# Patient Record
Sex: MALE | Race: OTHER | NOT HISPANIC OR LATINO | URBAN - METROPOLITAN AREA
[De-identification: names, ages, dates, MRNs, and addresses within clinical notes are randomized per-mention and may not be internally consistent; named-entity substitution may affect disease eponyms.]

---

## 2018-03-05 ENCOUNTER — EMERGENCY (EMERGENCY)
Facility: HOSPITAL | Age: 23
LOS: 0 days | Discharge: HOME | End: 2018-03-06

## 2018-03-05 VITALS
TEMPERATURE: 97 F | HEART RATE: 71 BPM | DIASTOLIC BLOOD PRESSURE: 68 MMHG | RESPIRATION RATE: 18 BRPM | OXYGEN SATURATION: 99 % | SYSTOLIC BLOOD PRESSURE: 133 MMHG
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
## 2018-03-05 DIAGNOSIS — R68.84 JAW PAIN: ICD-10-CM
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
## 2018-03-05 DIAGNOSIS — T74.11XA ADULT PHYSICAL ABUSE, CONFIRMED, INITIAL ENCOUNTER: ICD-10-CM

## 2018-03-06 NOTE — ED PROVIDER NOTE - PHYSICAL EXAMINATION
VITAL SIGNS: I have reviewed nursing notes and confirm.  CONSTITUTIONAL: Well-developed; well-nourished; in no acute distress.   SKIN: skin exam is warm and dry, no acute rash.    HEAD: Mild TTP along left jaw line, Normocephalic; atraumatic.  EYES: conjunctiva and sclera clear.  ENT: airway patent, able to open and close mouth with easeNo nasal discharge; airway clear.  CARD: S1, S2 normal; no murmurs, gallops, or rubs. Regular rate and rhythm.   RESP: No wheezes, rales or rhonchi.  EXT: Normal ROM.  No clubbing, cyanosis or edema.   NEURO: Alert, oriented, grossly unremarkable
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
## 2018-03-06 NOTE — ED PROVIDER NOTE - PROGRESS NOTE DETAILS
pt instructed to f/u with dental clinic if pain persists, pt did not want to stay for ct scan at this time, pt is tolerating secretions, opens and closes mouth with ease

## 2018-03-06 NOTE — ED PROVIDER NOTE - NS ED ROS FT
Eyes:  No visual changes, eye pain or discharge.  ENMT:  + jaw pain., No hearing changes, pain, discharge or infections. No neck pain or stiffness.  Respiratory:  No cough or respiratory distress. No hemoptysis. No history of asthma or RAD.  GI:  No nausea, vomiting, diarrhea or abdominal pain.  MS:  No myalgia, muscle weakness, joint pain or back pain.  Neuro:  No headache or weakness.  No LOC.  Skin:  No skin rash.   Endocrine: No history of thyroid disease or diabetes.  Except as documented in the HPI,  all other systems are negative.
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
## 2019-01-18 PROBLEM — Z00.00 ENCOUNTER FOR PREVENTIVE HEALTH EXAMINATION: Status: ACTIVE | Noted: 2019-01-18

## 2019-01-22 ENCOUNTER — APPOINTMENT (OUTPATIENT)
Dept: INTERNAL MEDICINE | Facility: CLINIC | Age: 24
End: 2019-01-22

## 2019-08-26 ENCOUNTER — EMERGENCY (EMERGENCY)
Facility: HOSPITAL | Age: 24
LOS: 0 days | Discharge: HOME | End: 2019-08-26
Attending: EMERGENCY MEDICINE | Admitting: EMERGENCY MEDICINE
Payer: SUBSIDIZED

## 2019-08-26 VITALS
DIASTOLIC BLOOD PRESSURE: 65 MMHG | RESPIRATION RATE: 18 BRPM | SYSTOLIC BLOOD PRESSURE: 131 MMHG | OXYGEN SATURATION: 98 % | TEMPERATURE: 99 F | HEART RATE: 96 BPM

## 2019-08-26 DIAGNOSIS — N48.89 OTHER SPECIFIED DISORDERS OF PENIS: ICD-10-CM

## 2019-08-26 DIAGNOSIS — Z20.2 CONTACT WITH AND (SUSPECTED) EXPOSURE TO INFECTIONS WITH A PREDOMINANTLY SEXUAL MODE OF TRANSMISSION: ICD-10-CM

## 2019-08-26 PROCEDURE — 99284 EMERGENCY DEPT VISIT MOD MDM: CPT

## 2019-08-26 RX ORDER — PENICILLIN G BENZATHINE 1200000 [IU]/2ML
2.4 INJECTION, SUSPENSION INTRAMUSCULAR ONCE
Refills: 0 | Status: COMPLETED | OUTPATIENT
Start: 2019-08-26 | End: 2019-08-26

## 2019-08-26 RX ORDER — CEFTRIAXONE 500 MG/1
250 INJECTION, POWDER, FOR SOLUTION INTRAMUSCULAR; INTRAVENOUS ONCE
Refills: 0 | Status: COMPLETED | OUTPATIENT
Start: 2019-08-26 | End: 2019-08-26

## 2019-08-26 RX ORDER — AZITHROMYCIN 500 MG/1
1 TABLET, FILM COATED ORAL ONCE
Refills: 0 | Status: COMPLETED | OUTPATIENT
Start: 2019-08-26 | End: 2019-08-26

## 2019-08-26 RX ADMIN — CEFTRIAXONE 250 MILLIGRAM(S): 500 INJECTION, POWDER, FOR SOLUTION INTRAMUSCULAR; INTRAVENOUS at 13:56

## 2019-08-26 RX ADMIN — AZITHROMYCIN 1 GRAM(S): 500 TABLET, FILM COATED ORAL at 13:56

## 2019-08-26 RX ADMIN — PENICILLIN G BENZATHINE 2.4 MILLION UNIT(S): 1200000 INJECTION, SUSPENSION INTRAMUSCULAR at 13:56

## 2019-08-26 NOTE — ED PROVIDER NOTE - PHYSICAL EXAMINATION
PHYSICAL EXAM:  Gen: Patient is well appearing, non cachetcic  Neck: FROM, supple, no cervical LAD  Resp: CTABL, no crackles/wheezes, good air entry, no tachypnea or retractions  CV: RRR, normal S1/S2, no murmurs   Abd: Soft, NT/ND, no HSM appreciated, +BS  : PHYSICAL EXAM:  Gen: Patient is well appearing, non cachetcic  Neck: FROM, supple, no cervical LAD  Resp: CTABL, no crackles/wheezes, good air entry, no tachypnea or retractions  CV: RRR, normal S1/S2, no murmurs   Abd: Soft, NT/ND, no HSM appreciated, +BS  : uncirmucised male, copious white penile discharge, testes descneded, cremasteric reflex bilaterally

## 2019-08-26 NOTE — ED PROVIDER NOTE - OBJECTIVE STATEMENT
24 year old male with a pmhx of mild intermittent asthma presenting with one week history of penile dicsharge, bleeding and burning on urination. As per patients symptoms have been progressing over the past week. He recently returned from [facility] and asked his friend who is an RN who recommended getting STI testing. Patient denies fever, abdominal pain, cough, congestion.  Patient sexually active with multiple female partners, inconsistent condom use, never been tested for STI before.   PMhx: intermittent asthma  Psx: none  UTD vaccines  NKDA

## 2019-08-26 NOTE — ED PROVIDER NOTE - PROGRESS NOTE DETAILS
will do sti panel, treat prophylactically with ceftriaxone, azithromycin, penicillin I personally evaluated the patient. I reviewed the Resident’s or Physician Assistant’s note (as assigned above), and agree with the findings and plan except as documented in my note. 23 y/o M with no PMH presents after traveling to the [facility] and having unprotected sex multiple times with various people. Pt returned white thick penile discharge and dysuria. No ulcers, bleeding, fevers, chills, groin pain, testicular pain or abdominal pain.  Exam: NAD, NCAT, HEENT: mmm, EOMI, PERRLA, Neck: supple, nontender, nl ROM, Heart: RRR, no murmur, Extremities 2+ b/l pulses intact. Lungs: BCTA, no signs of increased WOB, Abd: NTND, no guarding or rebound, no hernia palpated, no CVAT. : (+) White copious thick urethral discharge. Uncircumcised penis.  b/l nl testes, nl lie, nl scrotum, no lesions, nontender exam. MSK: chest, back, and ext nontender, nl rom, no deformity. Skin: No rash .Neuro: A&Ox3, normal strength, nl sensation throughout, normal speech. A/P: discussed and accepted empiric therapy. Labs, accept HIV testing. Given follow up with return precautions and safe sex instructions.
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
## 2019-08-27 LAB
C TRACH RRNA SPEC QL NAA+PROBE: SIGNIFICANT CHANGE UP
N GONORRHOEA RRNA SPEC QL NAA+PROBE: DETECTED
SPECIMEN SOURCE: SIGNIFICANT CHANGE UP
T PALLIDUM AB TITR SER: NEGATIVE — SIGNIFICANT CHANGE UP

## 2022-03-01 NOTE — ED PROVIDER NOTE - NSFOLLOWUPINSTRUCTIONS_ED_ALL_ED_FT
[Use of Plain Language] : use of plain language [Adequate] : adequate [None] : none ED evaluation and management discussed with the parent of the patient in detail.  Close PMD follow up encouraged.  Strict ED return instructions discussed in detail and parent was given the opportunity to ask any questions about their discharge diagnosis and instructions. Patient parent verbalized understanding.    CareNotes Sexually Transmitted Diseases Aftercare Instructions   Print  Share  Sexually Transmitted Diseases  Medically reviewed by [email or web address]. Last updated on Jun 19, 2019.    Health GuideDisease ReferenceCare NotesMedication ListQ & A  Overview Aftercare Instructions Ambulatory Care Discharge Care Inpatient Care En Español  WHAT YOU NEED TO KNOW:    A sexually transmitted disease (STD), is also called a sexually transmitted infection (STI). An STD is an infection caused by bacteria or a virus. STDs are spread by oral, genital, or anal sex. Some examples of STDs are HIV, chlamydia, syphilis, and gonorrhea.    DISCHARGE INSTRUCTIONS:  Return to the emergency department if:  You have genital swelling or pain, or unusual bleeding.  You have joint pain, rash, swollen lymph nodes, or night sweats.  You have severe abdominal pain.  Contact your healthcare provider if:  You have a fever.  Your symptoms do not go away or they get worse, even after treatment.  You have bleeding or pain during sex.  You have questions or concerns about your condition or care.

## 2023-03-28 NOTE — ED PROVIDER NOTE - CONDITION AT DISCHARGE:
----- Message from Alyssa Krishnamurthy sent at 3/28/2023 12:59 PM CDT -----  Regarding: Appt  Contact: [phone]  Patient Shawn is calling. Patient has an in office appt on 3/31. Patient stated he cant do an in office visit and would like to have a virtual. Please call patient at    [phone]    Thank You      Satisfactory

## 2024-08-26 NOTE — ED PROVIDER NOTE - NS ED MD DISPO DISCHARGE CCDA
Follow-up with primary MD ER for sudden worsening of symptoms.   Patient/Caregiver provided printed discharge information.